# Patient Record
(demographics unavailable — no encounter records)

---

## 2024-11-07 NOTE — ASSESSMENT
[FreeTextEntry1] : 31 you male, referred for evaluation of elevated lfts. He has a hX OF TOURETTES, on vyvanse and prozac, previously on adderall but transitioned back to vyvanse. NO etoh or illicit drugs. no dark urine. NO family hx of liver disease. No abdominal pain. Mixed hepatocellular/cholestatic pattern to lfts. No recent abx, supplements or travel hx. Previous chart, hx, labs reviewed for this evaluation  RTO 5/9/24- Feels fine, maintained on Vyvanse. Relates had covid in January and took paxlovid. Reviewed labs, nonexposed to HBV, HCV, immune to HAV. Iron studies with sat 13%,ASMA neg, ceruloplasmin, ama, TTG neg. Feels well. Sonogram of abdomen reviewed: normal and without gallstones.  RTO 11/7/24 Feels well, stable dose of Vyvanse and Fluoxetine. Using for TOurettes. Doing well. No herbal meds. Was taking vitamin d. No weight loss or weight gain. IMP: 1. elevated mixed hepatocellular/cholestatic elevated lfts improved, likely due to adderrall, doubt covid related 2. Overweight 3. Tourette's  PLAN: 1. cmp 2. GGT,iron levels, lipid 3. If labs normal, maintain Vyvanse and prn followup

## 2024-11-07 NOTE — HISTORY OF PRESENT ILLNESS
[de-identified] : 04/2024 normal [FreeTextEntry1] : 31 you male, referred for evaluation of elevated lfts. He has a hX OF TOURETTES, on vyvanse and prozac, previously on adderall but transitioned back to vyvanse. NO etoh or illicit drugs. no dark urine. NO family hx of liver disease. No abdominal pain. Mixed hepatocellular/cholestatic pattern to lfts. No recent abx, supplements or travel hx. Previous chart, hx, labs reviewed for this evaluation  RTO 5/9/24- Feels fine, maintained on Vyvanse. Relates had covid in January and took paxlovid. Reviewed labs, nonexposed to HBV, HCV, immune to HAV. Iron studies with sat 13%,ASMA neg, ceruloplasmin, ama, TTG neg. Feels well. Sonogram of abdomen reviewed: normal and without gallstones.  RTO 11/7/24 Feels well, stable dose of Vyvanse and Fluoxetine. Using for TOurettes. Doing well. No herbal meds. Was taking vitamin d. No weight loss or weight gain.

## 2025-03-19 NOTE — HISTORY OF PRESENT ILLNESS
[FreeTextEntry1] : This is a 32 y/o male with a pmhx of Tourette's, ADHD and OCD here today for an annual wellness exam. Patient feels well overall and has no acute concerns or complaints. Patient denies chest pain, dyspnea, palpitations, dizziness, syncope, changes in bowel/bladder habits or appetite.